# Patient Record
Sex: FEMALE | Race: WHITE
[De-identification: names, ages, dates, MRNs, and addresses within clinical notes are randomized per-mention and may not be internally consistent; named-entity substitution may affect disease eponyms.]

---

## 2019-10-07 ENCOUNTER — HOSPITAL ENCOUNTER (OUTPATIENT)
Dept: HOSPITAL 59 - SUR | Age: 74
Discharge: HOME | End: 2019-10-07
Attending: ORTHOPAEDIC SURGERY
Payer: MEDICARE

## 2019-10-07 DIAGNOSIS — E03.9: ICD-10-CM

## 2019-10-07 DIAGNOSIS — J45.909: ICD-10-CM

## 2019-10-07 DIAGNOSIS — M75.101: Primary | ICD-10-CM

## 2019-10-07 DIAGNOSIS — G47.33: ICD-10-CM

## 2019-10-07 DIAGNOSIS — M19.011: ICD-10-CM

## 2019-10-07 DIAGNOSIS — I34.1: ICD-10-CM

## 2019-10-07 DIAGNOSIS — F32.9: ICD-10-CM

## 2019-10-07 DIAGNOSIS — I10: ICD-10-CM

## 2019-10-07 DIAGNOSIS — K21.9: ICD-10-CM

## 2019-10-07 PROCEDURE — 29824 SHO ARTHRS SRG DSTL CLAVICLC: CPT

## 2019-10-07 PROCEDURE — 29827 SHO ARTHRS SRG RT8TR CUF RPR: CPT

## 2019-10-07 PROCEDURE — 01630 ANES OPN/ARTHR PX SHO JT NOS: CPT

## 2019-10-14 NOTE — OPERATIVE NOTE
DATE OF SURGERY: 



PREOPERATIVE DIAGNOSES: 

1. Right shoulder rotator cuff tear. 

2. Acromioclavicular joint arthrosis. 

3. Biceps tendon partial tear. 



POSTOPERATIVE DIAGNOSES:

1. Right shoulder rotator cuff tear. 

2. Acromioclavicular joint arthrosis. 

3. Biceps tendon partial tear. 

4. Glenohumeral arthrosis. 



OPERATION:

1. Diagnostic arthroscopy. 

2. Arthroscopic acromioplasty and subacromial decompression. 

3. Arthroscopic excision of distal clavicle AC joint. 

4. Arthroscopic rotator cuff repair. 

5. Arthroscopic biceps tenotomy. 



SURGEON: Robert Rodriguez MD 



ANESTHESIA: General endotracheal, interscalene block. 



ANESTHESIA PROVIDER: SONJA Vora



COMPLICATIONS: None. 



ESTIMATED BLOOD LOSS: Minimal. 



OPERATIVE FINDINGS: A full-thickness, medium tear anterior supraspinatus tendon,
some retraction, delamination posteriorly and a large tear of the biceps tendon.
Slight fraying of the subscap tendon. AC joint arthrosis. 



COMPONENTS PLACED: Two Smith [employer]hew 5.5 mm Regenesorb anchors with two #2 
Ultrabraid sutures and one Multifix suture anchor loaded with the 4 limbs of the
sutures. 



INDICATION: This is a 73-year-old female with persistent pain and dysfunction in
shoulder for several years. Nonoperative treatment. She had an ultrasound which 
showed a full-thickness anterior rotator cuff tear. She is scheduled for 
procedures above. I explained all risks and benefits in detail for her diagnoses
and procedures including but not limited to infection, nerve injury, vessel 
injury, persistent pain, numbness and tingling in shoulder, retear of rotator 
cuff, need for further procedures. All her questions were thoroughly answered. 
Rehab course outlined. She agreed to proceed. 



PROCEDURE: The patient was brought to the OR, placed in the beach chair 
position, prepped for surgery. General endotracheal anesthesia induced. Her 
right upper extremity and shoulder were prepped in sterile fashion. Prepped 
again with ChloraPrep. It was draped. Intraoperative timeout was performed. 
Next, the glenohumeral joint and subacromial space and AC joint were injected 
with 0.5% Marcaine with epi. Standard posterior arthroscopic portal established 
under direct visualization and diagnostic arthroscopy performed. 



The biceps tendon was partially torn. I medially cut and released that. Superior
labrum normal. Posterior superior labrum normal. Axial recess normal. Posterior 
inferior labrum normal. The undersurface of the rotator cuff was thoroughly 
inspected. There was a full-thickness tear anteriorly on the supraspinatus 
tendon. Retracted to the glenoid rim. The mid portion of the cuff site fraying 
articular surface otherwise no complete tear there and posterior portion of cuff
was normal. The articular cartilage of glenoid and humeral head had grade 1 
chondromalacia. The superior middle glenoid was intact. The subscap tendon had 
some slight undersurface tensile failure. Otherwise, no significant complete 
tear. The anterior and inferior labrum glenoid looked normal. 



Next we shaved the anterior portal and debrided back the greater tuberosity at 
the tear site posteriorly and articular surface partial fraying inserting blader
to bleeding bone in preparation for repair. Next, the anteroposterior 
subacromial port established. The tissue blader was inserted in the anterior 
subacromial portal and I performed bursectomy, removing the anterolateral edge 
of the acromion, incorporating the acromioclavicular ligament and opened up the 
inferior joint capsule. Next, a bur was inserted in the anterolateral portal and
burred down. We took off strips of bone from lateral, medial, anterior, and 
posterior including the type 3 acromion to a flat planar surface. We used a rasp
to smooth subacromial surface and verify it was flat with a probe from the 
posterior portal. 



Next, we inserted a bur in the anterior portal, burred down the medial acromion,
resected the distal cuff 1 cm, made a small stab incision superior over the AC 
joint, inserted the shaver there and verified the AC joint was completely free 
of any bony impingement or bony fragments with a shaver, smoothed both bony 
edges. Next, we thoroughly inspected the bursa. There was a cuff tear again. 
There was a full-thickness tear but medium in size about 2-3 cm wide with 
traction almost to the glenoid rim. We released the articular surface of the 
cuff no more than 1 cm medially and _____ suprascapular nerve from the base of 
the coracoid inferiorly and anterosuperiorly and posterosuperiorly. Then the 
cuff had better excursion over the greater tuberosity edge. 



Next, established accessory anterolateral portal with the disposable cannula and
localized entry point for rotator cuff anchor at the prepared greater tuberosity
articular margin after we previously burred that with a shaver to bleeding bone 
surface. Next, made small stab incision, inserted the punch tap, inserted anchor
to the surface of the bone. We passed 1 limb on each side of the suture with a 
curved penetrator working from anterior to posterior and covering the entire 
surface with 2 mattress sutures, 2 cm medial to the cuff tear edge. Next, placed
a lateral anchor at the greater tuberosity edge. This time we only passed one 
limb on each side of the suture slightly posterior to the previously-placed 
medial mattress sutures to create a Ranjit-Joseluis type construct. We then again 
tied down each corresponding mattress medial row suture set with a taut line 
hitch, slide and lock knot, backed up with three reverse half hitchs alternating
posterior throws and then with each corresponding lateral simple suture over the
top and cut those sutures, and left the 2 medial sutures. We inspected the cuff 
and it was down nicely; however, we could improve our fixation even better using
a lateral anchor. Therefore, we loaded the suture limbs;  loaded on to a 
Multifix anchor and around over the greater tuberosity edge and then we placed 
that anchor, tensioned the sutures, inserted the anchor, tapped down, inserted 
it, screwed it in place, and this tucked down, then cuffed the tear edge 
further, stabilizing our repair. Our bursal surface, rotator cuff was intact. 
Sutures intact. Repair was down and intraarticularly directly visualized. Cuff 
was down nicely. 



This completed our procedure. Scope and curtain were removed. Incisions were 
covered with Steri-Strips. The shoulder was bolused with 0.5% Marcaine with Epi 
and Exparel, and she received Toradol IV. Sterile dressing applied, UltraSling 
and ice cooler wrap. 



The patient tolerated procedure well. No intraoperative complications. Sponge, 
needle, and blade counts correct. Recovery room neurovascularly intact. She will
be discharged as an outpatient with [facility] nurse and follow up
in 2 weeks. 

KANDACE